# Patient Record
Sex: FEMALE | Race: WHITE
[De-identification: names, ages, dates, MRNs, and addresses within clinical notes are randomized per-mention and may not be internally consistent; named-entity substitution may affect disease eponyms.]

---

## 2017-10-27 ENCOUNTER — HOSPITAL ENCOUNTER (EMERGENCY)
Dept: HOSPITAL 31 - C.ER | Age: 47
Discharge: HOME | End: 2017-10-27
Payer: COMMERCIAL

## 2017-10-27 VITALS
TEMPERATURE: 97.8 F | OXYGEN SATURATION: 98 % | RESPIRATION RATE: 17 BRPM | HEART RATE: 77 BPM | DIASTOLIC BLOOD PRESSURE: 80 MMHG | SYSTOLIC BLOOD PRESSURE: 130 MMHG

## 2017-10-27 VITALS — BODY MASS INDEX: 28.2 KG/M2

## 2017-10-27 DIAGNOSIS — L08.9: ICD-10-CM
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
## 2017-10-27 DIAGNOSIS — S50.361A: Primary | ICD-10-CM

## 2017-10-27 NOTE — C.PDOC
History Of Present Illness





48 y/o female c/o itching to right arm, left thigh and arm x 2 days s/p ? bug 

bites. pt reports redness and itching,  but no pain on right elbow, denies 

fever and chills. 


Time Seen by Provider: 10/27/17 11:14


Chief Complaint (Nursing): Abnormal Skin Integrity


History Per: Patient


History/Exam Limitations: no limitations


Onset/Duration Of Symptoms: Days (2)


Current Symptoms Are (Timing): Better


Location Of Injury: Right: Elbow (erythematous area), Left: Leg (thigh- 2 small 

marks)


Quality Of Symptoms: Itching.  denies: Painful


Severity: Mild


Pain Scale Rating Of: 0





Past Medical History


Reviewed: Historical Data, Nursing Documentation, Vital Signs


Vital Signs: 





 Last Vital Signs











Temp  97.8 F   10/27/17 11:21


 


Pulse  77   10/27/17 11:21


 


Resp  17   10/27/17 11:21


 


BP  130/80   10/27/17 11:21


 


Pulse Ox  98   10/27/17 11:21














- Medical History


PMH: Gall Bladder Disease


   Denies: Chronic Kidney Disease


Surgical History: Cholecystectomy (x2)





- [facility] Procedures











RELEASE PERITONEUM, PERCUTANEOUS ENDOSCOPIC APPROACH (01/25/17)


RESECTION OF GALLBLADDER, PERCUTANEOUS ENDOSCOPIC APPROACH (01/25/17)


ROBOTIC ASSISTED PROCEDURE OF TRUNK, PERC ENDO APPROACH (01/25/17)








Family History: States: Unknown Family Hx





- Social History


Hx Alcohol Use: Yes (occasionally)


Hx Substance Use: No





- Immunization History


Hx Tetanus Toxoid Vaccination: No


Hx Influenza Vaccination: Yes (2017)


Hx Pneumococcal Vaccination: No





Review Of Systems


Constitutional: Negative for: Fever, Chills


Skin: Positive for: Lesions


Neurological: Negative for: Weakness, Numbness





Physical Exam





- Physical Exam


Appears: Non-toxic, No Acute Distress


Skin: Warm, Dry, Other (2  mildly erythematous 3 mm marks on left thigh, no 

swelling. 3 cm area of erythema, induration and warmth with central area of 

scab on right medial elbow, from of elbow, non-tender. )





ED Course And Treatment


O2 Sat by Pulse Oximetry: 98





Medical Decision Making


Medical Decision Making: 





pt with shirleyley bug bites, infected appearing one on right elbow, plan: urine 

preg, clindamycin and benadryl, f/u clinic. 





Disposition


Counseled Patient/Family Regarding: Diagnosis, Need For Followup, Rx Given





- Disposition


Referrals: 


 Service [Outside]


[facility] [Outside]


Disposition: HOME/ ROUTINE


Disposition Time: 11:48


Condition: STABLE


Additional Instructions: 





Por favor tome antibiticos segn lo prescrito. Tambin puede cristóbal benadryl 25 

mg (lo mismo que difenhidramina, medicamento de venta lulú) si es necesario 

para la picazn; luis medicamento puede causarle sueo, por lo tanto, no 

conduzca ni maneje maquinaria cuando tome luis medicamento. Si el 

enrojecimiento en el codo derecho contina aumentando, o si desarrolla dolor, 

dificultad para  el codo o la fiebre, regrese a la norma de emergencias 

para anthony evaluacin adicional. De lo contrario, timo el seguimiento en la cl

nathan mdica la prxima semana.





Please take antibiotics as prescribed. You may also take benadryl 25 mg (same 

as diphenhydramine- over the counter medication) if needed for itching; this 

medicine may make you sleepy, so please do not drive or operate machinery when 

taking this. If the redness on your right elbow continues to increase, or you 

develop pain, difficulty moving elbow or fever, return to ER for further 

evaluation. Otherwise please follow up in medical clinic next week. 


Prescriptions: 


Clindamycin [Cleocin] 300 mg PO Q6 #28 cap


Instructions:  Cellulitis (ED)


Forms:  Gen Discharge Inst Vietnamese, [facility] (Vietnamese)





- Clinical Impression


Clinical Impression: 


 Infected insect bite of elbow

## 2018-07-20 ENCOUNTER — HOSPITAL ENCOUNTER (EMERGENCY)
Dept: HOSPITAL 31 - C.ER | Age: 48
Discharge: HOME | End: 2018-07-20
Payer: COMMERCIAL

## 2018-07-20 VITALS
HEART RATE: 62 BPM | SYSTOLIC BLOOD PRESSURE: 150 MMHG | TEMPERATURE: 97.9 F | OXYGEN SATURATION: 98 % | RESPIRATION RATE: 18 BRPM | DIASTOLIC BLOOD PRESSURE: 74 MMHG

## 2018-07-20 VITALS — BODY MASS INDEX: 28.2 KG/M2
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
## 2018-07-20 DIAGNOSIS — S43.401A: Primary | ICD-10-CM

## 2018-07-20 NOTE — C.PDOC
History Of Present Illness


47-year-old female, presents to the emergency department with complaints of non-

traumatic right shoulder pain radiating to the back and arm for the past month. 

Patient states pain is persistent and worsening, she is taking Motrin at home 

with transient relief. She denies any fever, numbness/weakness, nausea/vomiting 

or any other associated symptoms. No other complaints at this time.


Time Seen by Provider: 07/20/18 10:25


Chief Complaint (Nursing): Upper Extremity Problem/Injury


History Per: Patient


History/Exam Limitations: no limitations


Onset/Duration Of Symptoms: Days


Current Symptoms Are (Timing): Still Present


Quality: Sharp





Past Medical History


Reviewed: Historical Data, Nursing Documentation, Vital Signs


Vital Signs: 


 Last Vital Signs











Temp  97.9 F   07/20/18 10:21


 


Pulse  62   07/20/18 10:21


 


Resp  18   07/20/18 10:21


 


BP  150/74   07/20/18 10:21


 


Pulse Ox  98   07/20/18 10:55














- Medical History


PMH: Gall Bladder Disease


Surgical History: Cholecystectomy





- [facility] Procedures








RELEASE PERITONEUM, PERCUTANEOUS ENDOSCOPIC APPROACH (01/25/17)


RESECTION OF GALLBLADDER, PERCUTANEOUS ENDOSCOPIC APPROACH (01/25/17)


ROBOTIC ASSISTED PROCEDURE OF TRUNK, PERC ENDO APPROACH (01/25/17)








Family History: States: No Known Family Hx





- Social History


Hx Alcohol Use: Yes (occasionally)


Hx Substance Use: No





- Immunization History


Hx Tetanus Toxoid Vaccination: No


Hx Influenza Vaccination: No


Hx Pneumococcal Vaccination: No





Review Of Systems


Constitutional: Negative for: Fever


Cardiovascular: Negative for: Chest Pain, Palpitations


Respiratory: Negative for: Shortness of Breath


Musculoskeletal: Positive for: Shoulder Pain


Neurological: Negative for: Weakness, Numbness





Physical Exam





- Physical Exam


Appears: Non-toxic, No Acute Distress


Skin: Normal Color, Warm, Dry, No Rash


Head: Atraumatic, Normacephalic


Eye(s): bilateral: Normal Inspection


Nose: Normal


Oral Mucosa: Moist


Lips: Normal Appearing


Neck: Normal ROM


Chest: Symmetrical


Cardiovascular: Rhythm Regular, No Murmur


Respiratory: Normal Breath Sounds, No Accessory Muscle Use


Extremity: Normal ROM, No Tenderness (+painful range of motion to right shoulder

), No Deformity, No Swelling


Neurological/Psych: Oriented x3, Normal Speech





ED Course And Treatment


O2 Sat by Pulse Oximetry: 98 (RA)


Pulse Ox Interpretation: Normal





Disposition


Counseled Patient/Family Regarding: Studies Performed, Diagnosis, Need For 

Followup, Rx Given





- Disposition


Referrals: 


[facility] [Outside]


Disposition: HOME/ ROUTINE


Disposition Time: 10:52


Condition: STABLE


Prescriptions: 


Naproxen [Naprosyn] 1 tab PO BID PRN #25 tab


 PRN Reason: Pain


Instructions:  Shoulder Pain (DC)


Forms:  [facility] (English)





- POA


Present On Arrival: None





- Clinical Impression


Clinical Impression: 


 Sprain








- Scribe Statement


The provider has reviewed the documentation as recorded by the Scribe (Praful Lutz)


Provider Attestation: 








All medical record entries made by the Scribe were at my direction and 

personally dictated by me. I have reviewed the chart and agree that the record 

accurately reflects my personal performance of the history, physical exam, 

medical decision making, and the department course for this patient. I have 

also personally directed, reviewed, and agree with the discharge instructions 

and disposition.

## 2019-02-06 ENCOUNTER — HOSPITAL ENCOUNTER (EMERGENCY)
Dept: HOSPITAL 31 - C.ER | Age: 49
Discharge: HOME | End: 2019-02-06
Payer: COMMERCIAL

## 2019-02-06 VITALS
TEMPERATURE: 98.4 F | HEART RATE: 71 BPM | SYSTOLIC BLOOD PRESSURE: 144 MMHG | DIASTOLIC BLOOD PRESSURE: 90 MMHG | OXYGEN SATURATION: 100 %

## 2019-02-06 VITALS — RESPIRATION RATE: 18 BRPM

## 2019-02-06 VITALS — BODY MASS INDEX: 28.2 KG/M2

## 2019-02-06 DIAGNOSIS — G44.009: Primary | ICD-10-CM

## 2019-02-06 LAB
ALBUMIN SERPL-MCNC: 4.2 G/DL (ref 3.5–5)
ALBUMIN/GLOB SERPL: 1.3 {RATIO} (ref 1–2.1)
ALT SERPL-CCNC: 36 U/L (ref 9–52)
AST SERPL-CCNC: 23 U/L (ref 14–36)
BASOPHILS # BLD AUTO: 0 K/UL (ref 0–0.2)
BASOPHILS NFR BLD: 0.4 % (ref 0–2)
BUN SERPL-MCNC: 19 MG/DL (ref 7–17)
CALCIUM SERPL-MCNC: 8 MG/DL (ref 8.6–10.4)
EOSINOPHIL # BLD AUTO: 0.1 K/UL (ref 0–0.7)
EOSINOPHIL NFR BLD: 1.5 % (ref 0–4)
ERYTHROCYTE [DISTWIDTH] IN BLOOD BY AUTOMATED COUNT: 12.4 % (ref 11.5–14.5)
GFR NON-AFRICAN AMERICAN: > 60
HGB BLD-MCNC: 14.1 G/DL (ref 11–16)
LYMPHOCYTES # BLD AUTO: 2.4 K/UL (ref 1–4.3)
LYMPHOCYTES NFR BLD AUTO: 31.4 % (ref 20–40)
MCH RBC QN AUTO: 32.2 PG (ref 27–31)
MCHC RBC AUTO-ENTMCNC: 34.3 G/DL (ref 33–37)
MCV RBC AUTO: 93.7 FL (ref 81–99)
MONOCYTES # BLD: 0.5 K/UL (ref 0–0.8)
MONOCYTES NFR BLD: 7.1 % (ref 0–10)
NEUTROPHILS # BLD: 4.5 K/UL (ref 1.8–7)
NEUTROPHILS NFR BLD AUTO: 59.6 % (ref 50–75)
NRBC BLD AUTO-RTO: 0.1 % (ref 0–2)
PLATELET # BLD: 227 K/UL (ref 130–400)
PMV BLD AUTO: 9 FL (ref 7.2–11.7)
RBC # BLD AUTO: 4.37 MIL/UL (ref 3.8–5.2)
WBC # BLD AUTO: 7.6 K/UL (ref 4.8–10.8)

## 2019-02-06 PROCEDURE — 99285 EMERGENCY DEPT VISIT HI MDM: CPT

## 2019-02-06 PROCEDURE — 80053 COMPREHEN METABOLIC PANEL: CPT

## 2019-02-06 PROCEDURE — 96374 THER/PROPH/DIAG INJ IV PUSH: CPT

## 2019-02-06 PROCEDURE — 96361 HYDRATE IV INFUSION ADD-ON: CPT

## 2019-02-06 PROCEDURE — 85025 COMPLETE CBC W/AUTO DIFF WBC: CPT

## 2019-02-06 PROCEDURE — 70450 CT HEAD/BRAIN W/O DYE: CPT

## 2019-02-06 PROCEDURE — 81025 URINE PREGNANCY TEST: CPT

## 2019-02-06 NOTE — C.PDOC
History Of Present Illness


47 y/o female presents to the ED complaining of a headache for 3 days. 

Associated with right eye tearing and right eye pain radiating to right side of 

her head. She also had 1 episode of vomiting yesterday. Otherwise patient denies

any extremity weakness, numbness, fevers, chills, night sweats, temporal pain, 

visual deficit, slurred speech, or change in gait. Headache is described as 

having gradual onset, and is not the worst headache of her life. Patient has no 

other deficits or associated complaints.





Time Seen by Provider: 19 10:12


Chief Complaint (Nursing): Headache


History Per: Patient


History/Exam Limitations: no limitations


Onset/Duration Of Symptoms: Days (3)


Current Symptoms Are (Timing): Still Present


Severity: Moderate


Associated Symptoms: Vomiting (x 1)





Past Medical History


Reviewed: Historical Data, Nursing Documentation, Vital Signs


Vital Signs: 





                                Last Vital Signs











Temp  99.1 F   19 09:56


 


Pulse  76   19 09:56


 


Resp  18   19 09:56


 


BP  148/90   19 09:56


 


Pulse Ox  98   19 09:56














- Medical History


PMH: Gall Bladder Disease


   Denies: Chronic Kidney Disease


Surgical History: Cholecystectomy





- [facility] Procedures











RELEASE PERITONEUM, PERCUTANEOUS ENDOSCOPIC APPROACH (17)


RESECTION OF GALLBLADDER, PERCUTANEOUS ENDOSCOPIC APPROACH (17)


ROBOTIC ASSISTED PROCEDURE OF TRUNK, PERC ENDO APPROACH (17)








Family History: States: Unknown Family Hx





- Social History


Hx Alcohol Use: Yes (occasionally)


Hx Substance Use: No





- Immunization History


Hx Tetanus Toxoid Vaccination: No


Hx Influenza Vaccination: No


Hx Pneumococcal Vaccination: No





Review Of Systems


Constitutional: Negative for: Fever, Chills, Sweats


Eyes: Positive for: Pain (right eye pain and increased tearing).  Negative for: 

Vision Change


Cardiovascular: Negative for: Palpitations, Light Headedness


Respiratory: Negative for: Cough, Shortness of Breath


Gastrointestinal: Negative for: Nausea, Vomiting (today)


Musculoskeletal: Negative for: Neck Pain, Back Pain


Skin: Negative for: Rash


Neurological: Positive for: Headache.  Negative for: Weakness, Numbness, 

Incoordination, Change in Speech, Confusion, Dizziness





Physical Exam





- Physical Exam


Appears: Non-toxic, No Acute Distress


Skin: Warm, Dry


Head: Atraumatic, Normacephalic, No Tenderness (No temporal tenderness)


Eye(s): bilateral: Normal Inspection, PERRL, EOMI


Oral Mucosa: Moist


Neck: Trachea Midline, Supple, Other (No meningeal signs- negative kernig's and 

brudzinskis)


Chest: Symmetrical


Cardiovascular: Rhythm Regular, No Friction Rub


Respiratory: No Rales, No Rhonchi, No Wheezing


Gastrointestinal/Abdominal: Soft, No Tenderness, No Distention


Extremity: Bilateral: Normal Color And Temperature


Pulses: Left Dorsalis Pedis: Normal, Right Dorsalis Pedis: Normal


Neurological/Psych: Oriented x3, Normal Cognition, Normal Cranial Nerves, Normal

Motor, Normal Sensation, No Romberg


Gait: Steady





ED Course And Treatment





- Laboratory Results


Result Diagrams: 


                                 19 10:56





                                 19 10:56


O2 Sat by Pulse Oximetry: 98 (RA)


Pulse Ox Interpretation: Normal





Medical Decision Making


Medical Decision Makin y/o F presents with headache for 3 days. Associated with right eye pain 

radiating to right side of head, and increased tearing from the right eye. No 

visual deficit, extremity weakness, numbness, slurred speech, or gait 

instability. No fevers, chills, night sweats, neck stiffness, or URI symptoms. 

Will treat patient for migraine headache and reassess.





Impression: Migraine headache





Plan:


--Blood work


--Urine preg POC


--CT Head


--IV fluids x 1 bolus


--10 mg IV Reglan


--Placed on high flow O2





1150


CT unremarkable, labs unremarkable


pain greatly improved


Neuro exam remains unremarkable


clear for d/c home with return indications and f/u 








Disposition





- Disposition


Referrals: 


Johnnie Titus MD [Staff Provider] - 


[facility] [Outside]


[facility] [Outside]


[facility] [Outside]


Disposition: HOME/ ROUTINE


Disposition Time: 11:47


Condition: GOOD


Additional Instructions: 





JOCELYN WELLINGTON, thank you for letting us take care of you today. Your provider was

 Miguel Strickland and you were treated for HEADACHE/FACE PAIN. The emergency medical 

care you received today was directed at your acute symptoms. If you were 

prescribed any medication, please fill it and take as directed. It may take sev

eral days for your symptoms to resolve. Return to the Emergency Department if 

your symptoms worsen, do not improve, or if you have any other problems.





Please contact your doctor or call one of the physicians/clinics you have been 

referred to that are listed on the Patient Visit Information form that is 

included in your discharge packet. Bring any paperwork you were given at 

discharge with you along with any medications you are taking to your follow up 

visit. Our treatment cannot replace ongoing medical care by a primary care 

provider outside of the emergency department.





Thank you for allowing the [facility] team to be part of your care today.








If you had an X-Ray or CT scan: A Radiologist will review the ED reading if any 

change in treatment is needed we will contact you.***





If you had a blood, urine, or wound culture: It will take several days for the 

results, if any change in treatment is needed we will contact you.***





If you had an STI test: It will take 48 hours for the results. Please call after

 1 week if you have not heard back.***


Instructions:  Cluster Headache, Migraine Headache (DC)


Forms:  [facility] (English)





- Clinical Impression


Clinical Impression: 


 Migraine headache, Cluster headache








- Scribe Statement


The provider has reviewed the documentation as recorded by the Dylan Shen





Provider Attestation: 


All medical record entries made by the Dylan were at my direction and 

personally dictated by me. I have reviewed the chart and agree that the record 

accurately reflects my personal performance of the history, physical exam, 

medical decision making, and the department course for this patient. I have also

 personally directed, reviewed, and agree with the discharge instructions and 

disposition.

## 2019-02-06 NOTE — CT
Date of service: 02/06/2019



PROCEDURE:  CT HEAD WITHOUT CONTRAST.



HISTORY:

ha



COMPARISON:

None available.



TECHNIQUE:

Axial computed tomography images were obtained through the head/brain 

without intravenous contrast.  



Radiation dose:



Total exam DLP = 1120.18 mGy-cm.



This CT exam was performed using one or more of the following dose 

reduction techniques: Automated exposure control, adjustment of the 

mA and/or kV according to patient size, and/or use of iterative 

reconstruction technique.



FINDINGS:



HEMORRHAGE:

No intracranial hemorrhage. 



BRAIN:

No mass effect or edema.  Bilateral basal ganglia calcifications.  No 

atrophy or chronic microvascular ischemic changes.



VENTRICLES:

No hydrocephalus. 



CALVARIUM:

Unremarkable.



PARANASAL SINUSES:

Unremarkable as visualized. No significant inflammatory changes.



MASTOID AIR CELLS:

Unremarkable as visualized. No inflammatory changes.



OTHER FINDINGS:

None.



IMPRESSION:

No acute intracranial pathology identified.